# Patient Record
Sex: FEMALE | Race: WHITE | Employment: FULL TIME | URBAN - METROPOLITAN AREA
[De-identification: names, ages, dates, MRNs, and addresses within clinical notes are randomized per-mention and may not be internally consistent; named-entity substitution may affect disease eponyms.]

---

## 2020-06-24 ENCOUNTER — TRANSFERRED RECORDS (OUTPATIENT)
Dept: HEALTH INFORMATION MANAGEMENT | Facility: CLINIC | Age: 40
End: 2020-06-24

## 2020-07-28 ENCOUNTER — TRANSFERRED RECORDS (OUTPATIENT)
Dept: HEALTH INFORMATION MANAGEMENT | Facility: CLINIC | Age: 40
End: 2020-07-28

## 2020-08-07 ENCOUNTER — TRANSFERRED RECORDS (OUTPATIENT)
Dept: HEALTH INFORMATION MANAGEMENT | Facility: CLINIC | Age: 40
End: 2020-08-07

## 2020-08-10 ENCOUNTER — TRANSCRIBE ORDERS (OUTPATIENT)
Dept: OTHER | Age: 40
End: 2020-08-10

## 2020-08-10 ENCOUNTER — MEDICAL CORRESPONDENCE (OUTPATIENT)
Dept: HEALTH INFORMATION MANAGEMENT | Facility: CLINIC | Age: 40
End: 2020-08-10

## 2020-08-10 ENCOUNTER — TRANSFERRED RECORDS (OUTPATIENT)
Dept: HEALTH INFORMATION MANAGEMENT | Facility: CLINIC | Age: 40
End: 2020-08-10

## 2020-08-10 DIAGNOSIS — M54.2 NECK PAIN: Primary | ICD-10-CM

## 2020-08-12 NOTE — TELEPHONE ENCOUNTER
RECORDS RECEIVED FROM: External   Date of Appt: 8/21/20   NOTES (FOR ALL VISITS) STATUS DETAILS   OFFICE NOTE from referring provider Received Dr Aide Colunga @ [facility]:  8/5/20   OFFICE NOTE from other specialist Received Frank Rowe NP @ [facility]:  7/28/20   DISCHARGE SUMMARY from hospital N/A    DISCHARGE REPORT from the ER N/A    OPERATIVE REPORT N/A    MEDICATION LIST Received    IMAGING  (FOR ALL VISITS)     EMG N/A    EEG N/A    MRI (HEAD, NECK, SPINE) Received [facility]:  MRI Lumbar Spine 8/7/20  MRI Thoracic Spine 8/7/20    [facility]:  MRI Lumbar Spine 6/24/20   LUMBAR PUNCTURE N/A    JANE Scan N/A    CT (HEAD, NECK, SPINE) N/A       Action 8/12/20 MV 8.59am   Action Taken Imaging request faxed to [facility] for:  MRI Lumbar Spine 8/7/20  MRI Thoracic Spine 8/7/20    Imaging request faxed to [facility] for:  MRI Lumbar Spine 6/24/20     Imaging Received  8/14/20 MV 12.52pm  [facility] + [facility]   Image Type (x): Disc:   PACS: x   Exam Date/Name MRI Lumbar Spine 8/7/20  MRI Thoracic Spine 8/7/20  MRI Lumbar Spine 6/24/20 Comments: images resolved in PACS

## 2020-08-21 ENCOUNTER — VIRTUAL VISIT (OUTPATIENT)
Dept: NEUROLOGY | Facility: CLINIC | Age: 40
End: 2020-08-21
Attending: PHYSICAL MEDICINE & REHABILITATION
Payer: COMMERCIAL

## 2020-08-21 ENCOUNTER — PRE VISIT (OUTPATIENT)
Dept: NEUROLOGY | Facility: CLINIC | Age: 40
End: 2020-08-21

## 2020-08-21 DIAGNOSIS — G04.91 MYELITIS (H): Primary | ICD-10-CM

## 2020-08-21 DIAGNOSIS — R20.2 PARESTHESIAS: ICD-10-CM

## 2020-08-21 RX ORDER — CHLORAL HYDRATE 500 MG
2 CAPSULE ORAL DAILY
COMMUNITY

## 2020-08-21 RX ORDER — MULTIPLE VITAMINS W/ MINERALS TAB 9MG-400MCG
1 TAB ORAL DAILY
COMMUNITY

## 2020-08-21 RX ORDER — LORAZEPAM 1 MG/1
1 TABLET ORAL
Qty: 1 TABLET | Refills: 0 | Status: SHIPPED | OUTPATIENT
Start: 2020-08-21

## 2020-08-21 RX ORDER — GABAPENTIN 300 MG/1
CAPSULE ORAL
Qty: 300 CAPSULE | Refills: 1 | Status: SHIPPED | OUTPATIENT
Start: 2020-08-21 | End: 2020-11-02

## 2020-08-21 RX ORDER — GABAPENTIN 300 MG/1
300 CAPSULE ORAL 3 TIMES DAILY
COMMUNITY
End: 2020-08-21 | Stop reason: ALTCHOICE

## 2020-08-21 NOTE — LETTER
8/21/2020       RE: Cleo Crowe  [address]  [facility] [address]     Dear Colleague,    Thank you for referring your patient, Cleo Crowe, to the [facility] NEUROLOGY at [facility]. Please see a copy of my visit note below.    [facility] Neurology Consultation    Cleo Crowe MRN# [id number]   Age: 39 year old YOB: 1980     Requesting physician: Aide Colunga  No primary care provider on file.     Reason for Consultation: neck pain and back pain      History of Presenting Symptoms:   Cleo Crowe is a 39 year old female who presents today for evaluation of neck and back pain.  The patient has been seen through [facility] for ongoing lower back pain and was recently evaluated on 8/5/2020.  During this visit, she reports a history of b/l knee pain due to running leading to R-knee arthroscopy, then worsening back and knee pain with was evaluated through [facility] ultimately leading to 05/2020 repeat R-knee arthroscopy. During this time period, her low back pain was severely worsening and she was developing numbness in her left posterior leg, which was evaluated at [facility] where the patient was given steroids, then had MRI lumbar spine which showed mild L5-S1 disc protrusion. The patient was given gabapentin 300 mg BID with 600 mg at bedtime, and had DANILO L5-S1 on 714/2020 which wasn't helpful.  By the time she was seen on 8/5/2020, the patient was reporting worsening numbness, tingling of the left leg with involvement of her saddle region w/out bowel/bladder incontinence.  After initial evaluation, the patient had MRI lumbar and thoracic spine (see data below) and on exam had antalgic gait on the left, difficulty with toe/heel walking due to pain, intact and normal reflexes, + straight leg raise on left, and weakness of left hip flexion and knee extension (due to pain, 4-/5). After evaluation through [facility], the patient  was referred to neurology due to concern of transverse myelitis.    Today, the patient reports that prior to her fusion surgery she had symptoms of right arm weakness with shoulder abduction along with pins/needles in 3rd and 4th digits and forearm, all of which were relieved following surgery.  The patient may have had off/on back tightness with lower back pain since 02/2020.  Around 04/2020, her symptoms became more severe but with the same location (center back pain, around tailbone) with radiation outwards towards her hips b/l.  The pain continued to increase through her w/up with [facility].  Around late 06/2020 she then developed pins and needles down the back of the left leg to her foot (sole), occurring at rest and with action.  Mid-July, she developed numbness on the back of her left leg to near her achilles tendon.  She then had injections (DANILO at S1, L5), and two weeks later she still had pain with these paresthesias, the pain is described as a hot-knife in the middle of her back.  On the first week of 08/2020, the patient developed groin numbness (can feel temperature, can feel herself wipe her rectum) with reports of feeling like she has been sitting on a bike for too long.  She reports that she can feel herself urinate and defecate,but just reports that this feeling is of warmth.  She has had no loss of bowel or bladder function.  Since 08/2020, she has started to develop numbness on her left forearm and tingling in her fingers (all of them).      The patient has no swallowing difficulties. No vision changes are noted. She has tightness in her neck but no major pain issues from flexion or extension of her neck.         Past Medical History:   Osteoarthritis of the knees  Seasonal allergies  Vitamin D deficiency (low on multiple)     Past Surgical History:   C5-6 fused due to disc herniation, 2012 at [facility]  Tubal ligation in 02/2013  Tonsilectomy     Social History:   , has 2 children. No  smoking history, Drinking history is negligible.      Family History:   No major autoimmune or neurological disorders to speak of  HTN, HLD, DMII, neuroendocrine tumor requiring whipple's for tumor removal - father     Medications:   Gabapentin 1200 mg total (300/300/600)  Aleve PRN  Multivitamin, other supplements     Allergies:   No Known Allergies     Review of Systems:   A comprehensive 10 point review of systems (constitutional, ENT, cardiac, peripheral vascular, lymphatic, respiratory, GI, , Musculoskeletal, skin, Neurological) was performed and found to be negative except as described in this note.      Physical Exam:   General: Seated comfortably in no acute distress.  HEENT: Neck supple with normal range of motion. Lhermitte sign today was negative.  Skin: No rashes  Neurologic:     Mental Status: Fully alert, attentive and oriented. Speech clear and fluent, no paraphasic errors.     Cranial Nerves: EOMI with normal smooth pursuit. Facial movements symmetric. Hearing not formally tested but intact to conversation.  No dysarthria.     Motor: No tremors or other abnormal movements observed.      Sensory: Negative Romberg.      Coordination: No major dysmetria     Gait: not tested today         Data: Pertinent prior to visit   Imaging:  MRI lumbar spine: 8/7/2020  Findings: L3-4 disc desccication with mild spondylosis.  Mild circumferential disc bulge without central or foraminal stenosis.  L4-5 disc desiccation mild spondylosis with mild circumferential disc with mild facet arthritis.  L5-S1 shallow central disc protrusion with mild right facet arthritis.  Impression:  - Mild lumbar spondylitic changes without spinal canal stenosis or neural foraminal narrowing.    MRI thoracic spine: 8/7/2020  T8 vertebral body hemangioma, no central or foraminal stenosis.    MRI lumbar spine: 6/24/2020  L4 superior endplate edema.  L3-4 disc desiccation with uncinate spurring without any significant central or foraminal  stenosis with moderate b/l facet arthritis.  L4-5 annular disc bulge with lef-sided annular fissure with moderate b/l facet arthritis without significant central or foraminal stenosis.  L5-S1 central disc protrusion without central or foraminal stenosis with moderate b/l facet arthritis.    CT head: 10/10/2010 - There is no evidence of acute intracerebral hemorrhage, epidural or subdural hematoma and there is no shift of midline structures. Visualized sinuses appear normal. No fractures are noted.         Assessment and Plan:   Assessment:  Paresthesias of left leg, and now left arm. Progressive  Localized central back pain radiating into hips and legs  Paresthesia with numbness in groin, no loss of bowel/bladder function with some diminished sensation    The patient has concerning report of symptoms usually attributed to cauda equina or conus medullaris syndromes.  Her imaging was unrevealing for etiologies of these syndromes (herniated disc, abscess, hematoma, tumor, trauma, stenosis). This doesn't necessarily rule out these syndromes, but other possible etiologies for her symptoms should be looked into given that there are no motor deficit in the legs, and her reflexes were intact as per mid 08/2020 evaluations with her other providers.  I would need to examine the patient myself to be certain of these prior findings or whether there is progression of her exam over a few weeks.  I am concerned that she is now developing paresthesias of the left arm, as this should not occur in localized lumbosacral related disorders, and given her cervical fusion history she may benefit from both cervical and brain MRI to look for possible myelopathy, myelitis, or brainstem lesions causing both arm and leg symptoms.  Once this imaging is obtained, and I have follow up with the patient for an exam, I will better be able to define where further testing needs to go (serum, CSF testing for carcinomatosis/viral infection/sarcoid, EMG  for CIDP/AIDP, referrals to other specialties, post-void residual).    The patient and her  are aware that should she develop any type of weakness of her legs, have worsened issues with bowel/bladder function, or develop infectious symptoms, that they should seek immediate medical care at an ED for imaging and evaluations thorough neurology and neurosurgery.     Plan:  MRI brain w/wout contrast  MRI cervical w/wout contrast  Gabapentin titration:   600/300/600 for 5 days, then   600/600/600 for 5 days, then   600/600/900     Follow up in Neurology clinic in 3-4 weeks in-person or earlier as needed should new concerns arise.      Greater than 50% of the total time (40 min) in this patient encounter was spent on counseling and/or coordination of care. We reviewed diagnostic results, impressions, and discussed other possible tests if symptoms do not improve. We discussed the implications of the diagnosis, as well as risks and benefits of management options. We reviewed treatment instructions and our scheduled follow-up as specified in the discharge plan. We also discussed the importance of compliance with the chosen course of treatment. The patient is in agreement with this plan and has no further questions.      Again, thank you for allowing me to participate in the care of your patient.  Sincerely,    Hans Jones D.O.   of Neurology

## 2020-08-21 NOTE — PROGRESS NOTES
[facility] Neurology Consultation    Cleo Crowe MRN# [id number]   Age: 39 year old YOB: 1980     Requesting physician: Aide Colunga  No primary care provider on file.     Reason for Consultation: neck pain and back pain      History of Presenting Symptoms:   Cleo Crowe is a 39 year old female who presents today for evaluation of neck and back pain.  The patient has been seen through [facility] for ongoing lower back pain and was recently evaluated on 8/5/2020.  During this visit, she reports a history of b/l knee pain due to running leading to R-knee arthroscopy, then worsening back and knee pain with was evaluated through [facility] ultimately leading to 05/2020 repeat R-knee arthroscopy. During this time period, her low back pain was severely worsening and she was developing numbness in her left posterior leg, which was evaluated at [facility] where the patient was given steroids, then had MRI lumbar spine which showed mild L5-S1 disc protrusion. The patient was given gabapentin 300 mg BID with 600 mg at bedtime, and had DANILO L5-S1 on 714/2020 which wasn't helpful.  By the time she was seen on 8/5/2020, the patient was reporting worsening numbness, tingling of the left leg with involvement of her saddle region w/out bowel/bladder incontinence.  After initial evaluation, the patient had MRI lumbar and thoracic spine (see data below) and on exam had antalgic gait on the left, difficulty with toe/heel walking due to pain, intact and normal reflexes, + straight leg raise on left, and weakness of left hip flexion and knee extension (due to pain, 4-/5). After evaluation through [facility], the patient was referred to neurology due to concern of transverse myelitis.    Today, the patient reports that prior to her fusion surgery she had symptoms of right arm weakness with shoulder abduction along with pins/needles in 3rd and 4th digits and forearm, all of which were relieved  following surgery.  The patient may have had off/on back tightness with lower back pain since 02/2020.  Around 04/2020, her symptoms became more severe but with the same location (center back pain, around tailbone) with radiation outwards towards her hips b/l.  The pain continued to increase through her w/up with [facility].  Around late 06/2020 she then developed pins and needles down the back of the left leg to her foot (sole), occurring at rest and with action.  Mid-July, she developed numbness on the back of her left leg to near her achilles tendon.  She then had injections (DANILO at S1, L5), and two weeks later she still had pain with these paresthesias, the pain is described as a hot-knife in the middle of her back.  On the first week of 08/2020, the patient developed groin numbness (can feel temperature, can feel herself wipe her rectum) with reports of feeling like she has been sitting on a bike for too long.  She reports that she can feel herself urinate and defecate,but just reports that this feeling is of warmth.  She has had no loss of bowel or bladder function.  Since 08/2020, she has started to develop numbness on her left forearm and tingling in her fingers (all of them).      The patient has no swallowing difficulties. No vision changes are noted. She has tightness in her neck but no major pain issues from flexion or extension of her neck.         Past Medical History:   Osteoarthritis of the knees  Seasonal allergies  Vitamin D deficiency (low on multiple)     Past Surgical History:   C5-6 fused due to disc herniation, 2012 at [facility]  Tubal ligation in 02/2013  Tonsilectomy     Social History:   , has 2 children. No smoking history, Drinking history is negligible.      Family History:   No major autoimmune or neurological disorders to speak of  HTN, HLD, DMII, neuroendocrine tumor requiring whipple's for tumor removal - father     Medications:   Gabapentin 1200 mg total  (300/300/600)  Aleve PRN  Multivitamin, other supplements     Allergies:   No Known Allergies     Review of Systems:   A comprehensive 10 point review of systems (constitutional, ENT, cardiac, peripheral vascular, lymphatic, respiratory, GI, , Musculoskeletal, skin, Neurological) was performed and found to be negative except as described in this note.      Physical Exam:   General: Seated comfortably in no acute distress.  HEENT: Neck supple with normal range of motion. Lhermitte sign today was negative.  Skin: No rashes  Neurologic:     Mental Status: Fully alert, attentive and oriented. Speech clear and fluent, no paraphasic errors.     Cranial Nerves: EOMI with normal smooth pursuit. Facial movements symmetric. Hearing not formally tested but intact to conversation.  No dysarthria.     Motor: No tremors or other abnormal movements observed.      Sensory: Negative Romberg.      Coordination: No major dysmetria     Gait: not tested today         Data: Pertinent prior to visit   Imaging:  MRI lumbar spine: 8/7/2020  Findings: L3-4 disc desccication with mild spondylosis.  Mild circumferential disc bulge without central or foraminal stenosis.  L4-5 disc desiccation mild spondylosis with mild circumferential disc with mild facet arthritis.  L5-S1 shallow central disc protrusion with mild right facet arthritis.  Impression:  - Mild lumbar spondylitic changes without spinal canal stenosis or neural foraminal narrowing.    MRI thoracic spine: 8/7/2020  T8 vertebral body hemangioma, no central or foraminal stenosis.    MRI lumbar spine: 6/24/2020  L4 superior endplate edema.  L3-4 disc desiccation with uncinate spurring without any significant central or foraminal stenosis with moderate b/l facet arthritis.  L4-5 annular disc bulge with lef-sided annular fissure with moderate b/l facet arthritis without significant central or foraminal stenosis.  L5-S1 central disc protrusion without central or foraminal stenosis with  moderate b/l facet arthritis.    CT head: 10/10/2010 - There is no evidence of acute intracerebral hemorrhage, epidural or subdural hematoma and there is no shift of midline structures. Visualized sinuses appear normal. No fractures are noted.         Assessment and Plan:   Assessment:  Paresthesias of left leg, and now left arm. Progressive  Localized central back pain radiating into hips and legs  Paresthesia with numbness in groin, no loss of bowel/bladder function with some diminished sensation    The patient has concerning report of symptoms usually attributed to cauda equina or conus medullaris syndromes.  Her imaging was unrevealing for etiologies of these syndromes (herniated disc, abscess, hematoma, tumor, trauma, stenosis). This doesn't necessarily rule out these syndromes, but other possible etiologies for her symptoms should be looked into given that there are no motor deficit in the legs, and her reflexes were intact as per mid 08/2020 evaluations with her other providers.  I would need to examine the patient myself to be certain of these prior findings or whether there is progression of her exam over a few weeks.  I am concerned that she is now developing paresthesias of the left arm, as this should not occur in localized lumbosacral related disorders, and given her cervical fusion history she may benefit from both cervical and brain MRI to look for possible myelopathy, myelitis, or brainstem lesions causing both arm and leg symptoms.  Once this imaging is obtained, and I have follow up with the patient for an exam, I will better be able to define where further testing needs to go (serum, CSF testing for carcinomatosis/viral infection/sarcoid, EMG for CIDP/AIDP, referrals to other specialties, post-void residual).    The patient and her  are aware that should she develop any type of weakness of her legs, have worsened issues with bowel/bladder function, or develop infectious symptoms, that they  should seek immediate medical care at an ED for imaging and evaluations thorough neurology and neurosurgery.     Plan:  MRI brain w/wout contrast  MRI cervical w/wout contrast  Gabapentin titration:   600/300/600 for 5 days, then   600/600/600 for 5 days, then   600/600/900     Follow up in Neurology clinic in 3-4 weeks in-person or earlier as needed should new concerns arise.    LUMA Jones D.O.   of Neurology    Greater than 50% of the total time (40 min) in this patient encounter was spent on counseling and/or coordination of care. We reviewed diagnostic results, impressions, and discussed other possible tests if symptoms do not improve. We discussed the implications of the diagnosis, as well as risks and benefits of management options. We reviewed treatment instructions and our scheduled follow-up as specified in the discharge plan. We also discussed the importance of compliance with the chosen course of treatment. The patient is in agreement with this plan and has no further questions.

## 2020-08-21 NOTE — LETTER
8/21/2020       RE: Cleo Crowe  [address]  [facility] [address]     Dear Colleague,    Thank you for referring your patient, Cleo Crowe, to the [facility] NEUROLOGY at [facility]. Please see a copy of my visit note below.    [facility] Neurology Consultation    Cleo Crowe MRN# [id number]   Age: 39 year old YOB: 1980     Requesting physician: Aide Colunga  No primary care provider on file.     Reason for Consultation: neck pain and back pain      History of Presenting Symptoms:   Cleo Crowe is a 39 year old female who presents today for evaluation of neck and back pain.  The patient has been seen through [facility] for ongoing lower back pain and was recently evaluated on 8/5/2020.  During this visit, she reports a history of b/l knee pain due to running leading to R-knee arthroscopy, then worsening back and knee pain with was evaluated through [facility] ultimately leading to 05/2020 repeat R-knee arthroscopy. During this time period, her low back pain was severely worsening and she was developing numbness in her left posterior leg, which was evaluated at [facility] where the patient was given steroids, then had MRI lumbar spine which showed mild L5-S1 disc protrusion. The patient was given gabapentin 300 mg BID with 600 mg at bedtime, and had DANILO L5-S1 on 714/2020 which wasn't helpful.  By the time she was seen on 8/5/2020, the patient was reporting worsening numbness, tingling of the left leg with involvement of her saddle region w/out bowel/bladder incontinence.  After initial evaluation, the patient had MRI lumbar and thoracic spine (see data below) and on exam had antalgic gait on the left, difficulty with toe/heel walking due to pain, intact and normal reflexes, + straight leg raise on left, and weakness of left hip flexion and knee extension (due to pain, 4-/5). After evaluation through [facility], the patient  was referred to neurology due to concern of transverse myelitis.    Today, the patient reports that prior to her fusion surgery she had symptoms of right arm weakness with shoulder abduction along with pins/needles in 3rd and 4th digits and forearm, all of which were relieved following surgery.  The patient may have had off/on back tightness with lower back pain since 02/2020.  Around 04/2020, her symptoms became more severe but with the same location (center back pain, around tailbone) with radiation outwards towards her hips b/l.  The pain continued to increase through her w/up with [facility].  Around late 06/2020 she then developed pins and needles down the back of the left leg to her foot (sole), occurring at rest and with action.  Mid-July, she developed numbness on the back of her left leg to near her achilles tendon.  She then had injections (DANILO at S1, L5), and two weeks later she still had pain with these paresthesias, the pain is described as a hot-knife in the middle of her back.  On the first week of 08/2020, the patient developed groin numbness (can feel temperature, can feel herself wipe her rectum) with reports of feeling like she has been sitting on a bike for too long.  She reports that she can feel herself urinate and defecate,but just reports that this feeling is of warmth.  She has had no loss of bowel or bladder function.  Since 08/2020, she has started to develop numbness on her left forearm and tingling in her fingers (all of them).      The patient has no swallowing difficulties. No vision changes are noted. She has tightness in her neck but no major pain issues from flexion or extension of her neck.         Past Medical History:   Osteoarthritis of the knees  Seasonal allergies  Vitamin D deficiency (low on multiple)     Past Surgical History:   C5-6 fused due to disc herniation, 2012 at [facility]  Tubal ligation in 02/2013  Tonsilectomy     Social History:   , has 2 children. No  smoking history, Drinking history is negligible.      Family History:   No major autoimmune or neurological disorders to speak of  HTN, HLD, DMII, neuroendocrine tumor requiring whipple's for tumor removal - father     Medications:   Gabapentin 1200 mg total (300/300/600)  Aleve PRN  Multivitamin, other supplements     Allergies:   No Known Allergies     Review of Systems:   A comprehensive 10 point review of systems (constitutional, ENT, cardiac, peripheral vascular, lymphatic, respiratory, GI, , Musculoskeletal, skin, Neurological) was performed and found to be negative except as described in this note.      Physical Exam:   General: Seated comfortably in no acute distress.  HEENT: Neck supple with normal range of motion. Lhermitte sign today was negative.  Skin: No rashes  Neurologic:     Mental Status: Fully alert, attentive and oriented. Speech clear and fluent, no paraphasic errors.     Cranial Nerves: EOMI with normal smooth pursuit. Facial movements symmetric. Hearing not formally tested but intact to conversation.  No dysarthria.     Motor: No tremors or other abnormal movements observed.      Sensory: Negative Romberg.      Coordination: No major dysmetria     Gait: not tested today         Data: Pertinent prior to visit   Imaging:  MRI lumbar spine: 8/7/2020  Findings: L3-4 disc desccication with mild spondylosis.  Mild circumferential disc bulge without central or foraminal stenosis.  L4-5 disc desiccation mild spondylosis with mild circumferential disc with mild facet arthritis.  L5-S1 shallow central disc protrusion with mild right facet arthritis.  Impression:  - Mild lumbar spondylitic changes without spinal canal stenosis or neural foraminal narrowing.    MRI thoracic spine: 8/7/2020  T8 vertebral body hemangioma, no central or foraminal stenosis.    MRI lumbar spine: 6/24/2020  L4 superior endplate edema.  L3-4 disc desiccation with uncinate spurring without any significant central or foraminal  stenosis with moderate b/l facet arthritis.  L4-5 annular disc bulge with lef-sided annular fissure with moderate b/l facet arthritis without significant central or foraminal stenosis.  L5-S1 central disc protrusion without central or foraminal stenosis with moderate b/l facet arthritis.    CT head: 10/10/2010 - There is no evidence of acute intracerebral hemorrhage, epidural or subdural hematoma and there is no shift of midline structures. Visualized sinuses appear normal. No fractures are noted.         Assessment and Plan:   Assessment:  Paresthesias of left leg, and now left arm. Progressive  Localized central back pain radiating into hips and legs  Paresthesia with numbness in groin, no loss of bowel/bladder function with some diminished sensation    The patient has concerning report of symptoms usually attributed to cauda equina or conus medullaris syndromes.  Her imaging was unrevealing for etiologies of these syndromes (herniated disc, abscess, hematoma, tumor, trauma, stenosis). This doesn't necessarily rule out these syndromes, but other possible etiologies for her symptoms should be looked into given that there are no motor deficit in the legs, and her reflexes were intact as per mid 08/2020 evaluations with her other providers.  I would need to examine the patient myself to be certain of these prior findings or whether there is progression of her exam over a few weeks.  I am concerned that she is now developing paresthesias of the left arm, as this should not occur in localized lumbosacral related disorders, and given her cervical fusion history she may benefit from both cervical and brain MRI to look for possible myelopathy, myelitis, or brainstem lesions causing both arm and leg symptoms.  Once this imaging is obtained, and I have follow up with the patient for an exam, I will better be able to define where further testing needs to go (serum, CSF testing for carcinomatosis/viral infection/sarcoid, EMG  "for CIDP/AIDP, referrals to other specialties, post-void residual).    The patient and her  are aware that should she develop any type of weakness of her legs, have worsened issues with bowel/bladder function, or develop infectious symptoms, that they should seek immediate medical care at an ED for imaging and evaluations thorough neurology and neurosurgery.     Plan:  MRI brain w/wout contrast  MRI cervical w/wout contrast  Gabapentin titration:   600/300/600 for 5 days, then   600/600/600 for 5 days, then   600/600/900     Follow up in Neurology clinic in 3-4 weeks in-person or earlier as needed should new concerns arise.    LUMA Jones D.O.   of Neurology    Greater than 50% of the total time (40 min) in this patient encounter was spent on counseling and/or coordination of care. We reviewed diagnostic results, impressions, and discussed other possible tests if symptoms do not improve. We discussed the implications of the diagnosis, as well as risks and benefits of management options. We reviewed treatment instructions and our scheduled follow-up as specified in the discharge plan. We also discussed the importance of compliance with the chosen course of treatment. The patient is in agreement with this plan and has no further questions.      Cleo Crowe is a 39 year old female who is being evaluated via a billable video visit.      The patient has been notified of following:     \"This video visit will be conducted via a call between you and your physician/provider. We have found that certain health care needs can be provided without the need for an in-person physical exam.  This service lets us provide the care you need with a video conversation.  If a prescription is necessary we can send it directly to your pharmacy.  If lab work is needed we can place an order for that and you can then stop by our lab to have the test done at a later time.    Video visits are billed at " "different rates depending on your insurance coverage.  Please reach out to your insurance provider with any questions.    If during the course of the call the physician/provider feels a video visit is not appropriate, you will not be charged for this service.\"    Patient has given verbal consent for Video visit? YES  How would you like to obtain your AVS? MYCHART  If you are dropped from the video visit, the video invite should be resent to:   [email or web address]  Will anyone else be joining your video visit?         Video-Visit Details    Type of service:  Video Visit    Video Start Time: 1030  Video End Time: 11:33 AM    Originating Location (pt. Location): Home    Distant Location (provider location):  [facility] NEUROLOGY     Platform used for Video Visit: Earl Justice, EMT          Again, thank you for allowing me to participate in the care of your patient.      Sincerely,    Alen Jones, DO      "

## 2020-08-21 NOTE — PROGRESS NOTES
"Cleo Crowe is a 39 year old female who is being evaluated via a billable video visit.      The patient has been notified of following:     \"This video visit will be conducted via a call between you and your physician/provider. We have found that certain health care needs can be provided without the need for an in-person physical exam.  This service lets us provide the care you need with a video conversation.  If a prescription is necessary we can send it directly to your pharmacy.  If lab work is needed we can place an order for that and you can then stop by our lab to have the test done at a later time.    Video visits are billed at different rates depending on your insurance coverage.  Please reach out to your insurance provider with any questions.    If during the course of the call the physician/provider feels a video visit is not appropriate, you will not be charged for this service.\"    Patient has given verbal consent for Video visit? YES  How would you like to obtain your AVS? MYCHART  If you are dropped from the video visit, the video invite should be resent to:   [email or web address]  Will anyone else be joining your video visit?         Video-Visit Details    Type of service:  Video Visit    Video Start Time: 1030  Video End Time: 11:33 AM    Originating Location (pt. Location): Home    Distant Location (provider location):  [facility] NEUROLOGY     Platform used for Video Visit: Earl Justice, EMT        "

## 2020-10-06 ASSESSMENT — ENCOUNTER SYMPTOMS
MEMORY LOSS: 0
SPEECH CHANGE: 1
NUMBNESS: 1
PARALYSIS: 0
BACK PAIN: 1
ARTHRALGIAS: 1
JOINT SWELLING: 0
MUSCLE CRAMPS: 1
TREMORS: 0
STIFFNESS: 1
DIZZINESS: 1
DISTURBANCES IN COORDINATION: 1
HEADACHES: 0
SEIZURES: 0
LOSS OF CONSCIOUSNESS: 0
MYALGIAS: 1
TINGLING: 1
WEAKNESS: 1
NECK PAIN: 1
MUSCLE WEAKNESS: 1

## 2020-10-07 ENCOUNTER — AMBULATORY - HEALTHEAST (OUTPATIENT)
Dept: SURGERY | Facility: CLINIC | Age: 40
End: 2020-10-07

## 2020-10-07 DIAGNOSIS — Z11.59 ENCOUNTER FOR SCREENING FOR OTHER VIRAL DISEASES: ICD-10-CM

## 2020-10-19 ASSESSMENT — MIFFLIN-ST. JEOR: SCORE: 1350.35

## 2020-10-23 ENCOUNTER — AMBULATORY - HEALTHEAST (OUTPATIENT)
Dept: LAB | Facility: CLINIC | Age: 40
End: 2020-10-23

## 2020-10-23 DIAGNOSIS — Z11.59 ENCOUNTER FOR SCREENING FOR OTHER VIRAL DISEASES: ICD-10-CM

## 2020-10-25 ENCOUNTER — COMMUNICATION - HEALTHEAST (OUTPATIENT)
Dept: SCHEDULING | Facility: CLINIC | Age: 40
End: 2020-10-25

## 2020-10-26 ENCOUNTER — ANESTHESIA - HEALTHEAST (OUTPATIENT)
Dept: SURGERY | Facility: CLINIC | Age: 40
End: 2020-10-26

## 2020-10-26 ENCOUNTER — SURGERY - HEALTHEAST (OUTPATIENT)
Dept: SURGERY | Facility: CLINIC | Age: 40
End: 2020-10-26

## 2020-10-26 ASSESSMENT — MIFFLIN-ST. JEOR
SCORE: 1350.35
SCORE: 1350.35

## 2020-11-02 ENCOUNTER — OFFICE VISIT (OUTPATIENT)
Dept: NEUROLOGY | Facility: CLINIC | Age: 40
End: 2020-11-02
Payer: COMMERCIAL

## 2020-11-02 ENCOUNTER — TELEPHONE (OUTPATIENT)
Dept: NEUROLOGY | Facility: CLINIC | Age: 40
End: 2020-11-02

## 2020-11-02 VITALS
SYSTOLIC BLOOD PRESSURE: 142 MMHG | OXYGEN SATURATION: 100 % | WEIGHT: 144 LBS | DIASTOLIC BLOOD PRESSURE: 88 MMHG | HEART RATE: 80 BPM

## 2020-11-02 DIAGNOSIS — R20.2 PARESTHESIAS: Primary | ICD-10-CM

## 2020-11-02 PROCEDURE — 99214 OFFICE O/P EST MOD 30 MIN: CPT | Performed by: PSYCHIATRY & NEUROLOGY

## 2020-11-02 RX ORDER — POLYETHYLENE GLYCOL 3350 17 G/17G
17 POWDER, FOR SOLUTION ORAL
COMMUNITY
Start: 2020-10-26

## 2020-11-02 RX ORDER — AMOXICILLIN 250 MG
1-2 CAPSULE ORAL
COMMUNITY
Start: 2020-10-26

## 2020-11-02 RX ORDER — ALBUTEROL SULFATE 90 UG/1
2 AEROSOL, METERED RESPIRATORY (INHALATION)
COMMUNITY

## 2020-11-02 RX ORDER — OXYCODONE HYDROCHLORIDE 5 MG/1
5 TABLET ORAL
COMMUNITY
Start: 2020-10-26

## 2020-11-02 RX ORDER — VIT C/B6/B5/MAGNESIUM/HERB 173 50-5-6-5MG
500 CAPSULE ORAL
COMMUNITY

## 2020-11-02 RX ORDER — ASPIRIN 325 MG
325 TABLET ORAL
COMMUNITY
Start: 2020-10-26

## 2020-11-02 RX ORDER — NAPROXEN SODIUM 220 MG
220 TABLET ORAL
COMMUNITY

## 2020-11-02 RX ORDER — TRAMADOL HYDROCHLORIDE 50 MG/1
50 TABLET ORAL
COMMUNITY
Start: 2020-10-19

## 2020-11-02 RX ORDER — HYDROXYZINE HYDROCHLORIDE 25 MG/1
25 TABLET, FILM COATED ORAL
COMMUNITY
Start: 2020-10-26

## 2020-11-02 RX ORDER — ACETAMINOPHEN 325 MG/1
650 TABLET ORAL
COMMUNITY
Start: 2020-10-26

## 2020-11-02 RX ORDER — KRILL/OM-3/DHA/EPA/PHOSPHO/AST 500-110 MG
1 CAPSULE ORAL
COMMUNITY

## 2020-11-02 RX ORDER — ERGOCALCIFEROL 1.25 MG/1
50000 CAPSULE ORAL
COMMUNITY

## 2020-11-02 RX ORDER — FOLIC ACID/MULTIVIT,IRON,MINER 0.4MG-18MG
TABLET ORAL
COMMUNITY

## 2020-11-02 RX ORDER — GABAPENTIN 300 MG/1
CAPSULE ORAL
Qty: 300 CAPSULE | Refills: 1 | Status: SHIPPED | OUTPATIENT
Start: 2020-11-02

## 2020-11-02 ASSESSMENT — PAIN SCALES - GENERAL: PAINLEVEL: MODERATE PAIN (4)

## 2020-11-02 NOTE — TELEPHONE ENCOUNTER
I confirmed with pharmacy pt had been taking 7 capsules daily of gabapentin prior to taper order today. They said they calculated how may pills would be needed to complete taper would by 84 pills for a 24 day supply. I confirmed that would be okay to adjust quantity to 84 from 300.     We were disconnected. I attempted call back to confirm that was all that was needed but could not get through.     Lena ROSS   Bedside US showed approximately 250cc of urine in bladder despite patient self catheterizing herself recently. Patient resting comfortably without return of gi bleeding. Awaiting repeat CBC. Patient stable without further bleeding. Patient to follow-up with PMD and GI. Patient called to discuss her results again. She states bleeding has not returned but admits to being very nervous about having to undergo a colonoscopy and what might be found. Attempted to alleviated some of her fears. She states she has an appointment with Dr. Cox in a couple of days.

## 2020-11-02 NOTE — TELEPHONE ENCOUNTER
Returned call to pharmacy. I confirmed okay to adjust pt gabapentin quantity to 84 pills with 0 refills to match taper directions.     Lena ROSS

## 2020-11-02 NOTE — LETTER
Date:November 22, 2020      Patient was self referred, no letter generated. Do not send.        [facility] Health Information

## 2020-11-02 NOTE — TELEPHONE ENCOUNTER
[facility] Call Center    Phone Message    May a detailed message be left on voicemail: yes     Reason for Call: Other: Chinyere calling to request a call back. She needs clarification on gabapentin (NEURONTIN) 300 MG capsule. Please call her back to discuss.      Action Taken: Message routed to:  [facility] ([facility] neuro     Travel Screening: Not Applicable

## 2020-11-02 NOTE — LETTER
11/2/2020       RE: Cleo Crowe  [address]  [facility] [address]     Dear Colleague,    Thank you for referring your patient, Cleo Crowe, to the [facility] NEUROLOGY CLINIC [place] at [facility]. Please see a copy of my visit note below.    [facility] Neurology Follow Up Visit    Cleo Crowe MRN# [id number]   Age: 39 year old YOB: 1980     Brief history of symptoms: The patient was initially seen in neurologic consultation on 8/21/2020 for evaluation of neck and back pain. Please see the comprehensive neurologic consultation note from that date in the Epic records for details.  Primary concern for initial consultation was for transverse myelitis in the setting of history of b/l knee pain, C5-6 fusion due to disc herniation (2012, [facility]),  L5-S1 disc protrusion with DANILO (L5-S1) on 7/14/2020, and developing (8/5/220) numbness/tingling of the left leg w/out bladder/bowel incontinence.  Exam prior to consultation was + for antalgic gait on left, normal reflexes, + straight leg on left, weakness on left hip flexion/knee extension.  Imaging of lumbar spine (MRI) on 8/7/2020 was suggestive for disc desiccation at L3-4, mild disc bulge at the same level, and shallow central disc protrusion w/mild right facet arthritis.  After initial virtual evaluation, the patient was to have imaging of the brain and cervical spine as well as trial gabapentin for pain relief.      MRI brain and cervical spine were done 8/21/2020 through [facility].  Overall, images were unrevealing for stroke, myelitis, central cord compression, or major neuro foraminal stenosis which could lead to her symptoms.    Interval history: The patient had right osteoarthritis of the hip and was admitted 10/26/2020 for total right hip arthroplasty.      Today, event past the procedure (hip replacement) the patient still has pins and needles in the b/l thighs b/l, and  left leg/sole of foot which as been ever present since 06/2020.  Gabapentin hasn't been helpful so far for the numbness and tingling.  There is no discernable weakness, and no further spread of numbness/tingling into other regions of the leg.  The symptoms remain constant and are difficult to trigger with specific motions of the leg or body/back.       Medications:     Current Outpatient Medications   Medication Sig     CLARITIN 10 MG OR TABS ONE DAILY     DEMULEN 1/35 (28) 1-35 MG-MCG OR TABS 1 TABLET DAILY     fish oil-omega-3 fatty acids 1000 MG capsule Take 2 g by mouth daily     gabapentin (NEURONTIN) 300 MG capsule Take 600 mg AM, 300 mg noon, 600 mg PM (600/300/600) for 5 days.  Then, take 600/600/600 for five days. Then take 600/600/900.     HEMP OIL OR EXTRACT OR OTHER CBD CANNABINOID, NOT MEDICAL CANNABIS,      LORazepam (ATIVAN) 1 MG tablet Take 1 tablet (1 mg) by mouth once as needed for anxiety (Take one hour prior to imaging study)     multivitamin w/minerals (MULTI-VITAMIN) tablet Take 1 tablet by mouth daily     SEASONALE 0.15-0.03 MG OR TABS 1 TABLET DAILY     vitamin D3 (CHOLECALCIFEROL) 1.25 MG (40290 UT) capsule Take 50,000 Units by mouth every 7 days      Review of Systems:   A comprehensive 10 point review of systems (constitutional, ENT, cardiac, peripheral vascular, lymphatic, respiratory, GI, , Musculoskeletal, skin, Neurological) was performed and found to be negative except as described in this note.      Physical Exam:   Vitals: BP (!) 142/88   Pulse 80   Wt 65.3 kg (144 lb)   SpO2 100%    General: Seated comfortably in no acute distress.  HEENT: Neck supple with normal range of motion. No paracervical muscle tenderness or tightness.   Heart: Regular rate  Lungs: breathing comfortably  GI: Non tender  Extremities: no edema. Recent surgical incision on right hip.  Skin: No rashes  Neurologic:     Mental Status: Fully alert, attentive and oriented. Speech clear and fluent, no  paraphasic errors.      Cranial Nerves: Visual fields intact. PERRL. EOMI with normal smooth pursuit. Facial sensation intact/symmetric. Facial movements symmetric. Hearing not formally tested but intact to conversation. Palate elevation symmetric, uvula midline. No dysarthria. Shoulder shrug strong bilaterally. Tongue protrusion midline.     Motor: No tremors or other abnormal movements observed. Muscle tone normal throughout. No pronator drift. Normal/symmetric rapid finger tapping. Strength 5/5 throughout upper and lower extremities except for weakness attributed to recent surgery on right hip leading to painful movements.     Deep Tendon Reflexes: 2+/symmetric throughout upper and lower extremities. No clonus. Toes downgoing bilaterally.     Sensory: lateral anterior thigh numbness (L4/5 distribution + lateral femoral cutaneous nerve distribution) as well as S1/L5 dermatomal pattern of sensory loss in posterior leg and plantar surface of the foot. No diminished vibration at great toes b/l.  Proprioception intact.     Gait: Antalgic gait with use of cane following right hip surgery    Pertinent Investigations since last visit:   MRI cervical spine and brain: 8/21/2020  FINDINGS:  HEAD MRI:  INTRACRANIAL CONTENTS: No finding for acute infarct, extra-axial collection, or abnormal enhancement. No significant parenchymal signal abnormality. Normal ventricular size and configuration. Major intracranial vascular flow voids are maintained. Brainstem and cerebellum are unremarkable. Cerebellar tonsils are normally positioned.  SELLA: No abnormality accounting for technique.  OSSEOUS STRUCTURES/SOFT TISSUES: Normal marrow signal.  ORBITS: No abnormality accounting for technique.   SINUSES/MASTOIDS: No paranasal sinus mucosal disease. No middle ear or mastoid effusion.     CERVICAL SPINE:   Anterior approach discectomy and fusion of C5-C6 with solid bony fusion across the disc space. Straightening of usual cervical  lordosis with minimal anterolisthesis of C3 on C4 and minimal retrolisthesis of C4 on C5. Otherwise unremarkable alignment. Maintained vertebral body heights. Type I Modic endplate degenerative change anteriorly at C6-C7 with type II Modic endplate degenerative change anteriorly at C4-C5. Otherwise unremarkable marrow signal elsewhere. No abnormal cord signal or enhancement. No extraspinal abnormality.  Craniovertebral junction and C1-C2: Normal.  C2-C3: Normal disc height. No herniation. Normal facets. No spinal canal or neural foraminal stenosis.   C3-C4: Normal disc height. No herniation. Normal facets. No spinal canal or neural foraminal stenosis.   C4-C5: Mild loss of disc height with a small posterior disc osteophyte complex, accompanied by right-sided uncinate spurring. Facet arthropathy is mild/minimal bilaterally. Mild spinal canal stenosis. Moderate right and minimal left neural foraminal stenosis.   C5-C6: Anterior approach discectomy and fusion with solid bony fusion across the disc space. No significant facet arthropathy. Spinal canal and neural foramina are widely patent.   C6-C7: Mild loss of disc height with a small posterior disc osteophyte complex. No significant facet arthropathy. Mild spinal canal stenosis with no significant right neural foraminal stenosis. Minimal left neural foraminal stenosis.   C7-T1: Normal disc height. No herniation. Normal facets. No spinal canal or neural foraminal stenosis.    IMPRESSION:  HEAD MRI:   1.  Normal brain MRI.  CERVICAL SPINE MRI:  1.  Anterior approach discectomy and fusion of C5 to C6 with solid bony fusion across the disc space and a mild adjacent segment degenerative disc disease at both the C4-C5 and C6-C7 levels.  2.  Small posterior disc osteophyte complexes at C4-C5 and at C6-C7 contribute to a mild spinal canal stenosis.  3.  Apparent moderate right and minimal left C4-C5 neural foraminal stenosis with additional minimal left C6-C7 neural foraminal  stenosis.  4.  No cord signal abnormality and no abnormal cord enhancement.         Assessment and Plan:   Assessment:  Sensory loss over left anterior thigh, posterior leg, and sole of foot    The patient still has some sensory loss or disruption of light-touch and pain over an atypical distribution along the left leg.  Her sensory loss would be over a mixture of L4-S1 sensory regions but would be patchy, and wouldn't be due to lateral femoral cutaneous syndrome due to the inclusion of the leg and foot. Given the lack of progression regarding Cervical Spine MRI and normal brain MRI, I suspect some of her symptoms may be related to prior lumbar imaging showing  L3-4  disc bulge and desiccation, as well as shallow central disc protrusion w/mild right facet arthritis.  Further investigation with EMG would be helpful to see if there is an axonal or demyelinating process affecting individual sensory nerves versus other compression leading to atypical sensory loss.      Plan:  EMG b/l lower extremities (symptoms predominantly on left, but sensory loss is reported still in the anterior thigh of the right leg)    Follow up in Neurology clinic in 5 weeks or earlier as needed should new concerns arise.    LUMA Jones D.O.   of Neurology      Greater than 50% of the total time (40 min) in this patient encounter was spent on counseling and/or coordination of care. We reviewed diagnostic results, impressions, and discussed other possible tests if symptoms do not improve. We discussed the implications of the diagnosis, as well as risks and benefits of management options. We reviewed treatment instructions and our scheduled follow-up as specified in the discharge plan. We also discussed the importance of compliance with the chosen course of treatment. The patient is in agreement with this plan and has no further questions.        Again, thank you for allowing me to participate in the care of your  patient.      Sincerely,    Alen Jones, DO

## 2020-11-02 NOTE — PROGRESS NOTES
[facility] Neurology Follow Up Visit    Cleo Crowe MRN# [id number]   Age: 39 year old YOB: 1980     Brief history of symptoms: The patient was initially seen in neurologic consultation on 8/21/2020 for evaluation of neck and back pain. Please see the comprehensive neurologic consultation note from that date in the Epic records for details.  Primary concern for initial consultation was for transverse myelitis in the setting of history of b/l knee pain, C5-6 fusion due to disc herniation (2012, [facility]),  L5-S1 disc protrusion with DANILO (L5-S1) on 7/14/2020, and developing (8/5/220) numbness/tingling of the left leg w/out bladder/bowel incontinence.  Exam prior to consultation was + for antalgic gait on left, normal reflexes, + straight leg on left, weakness on left hip flexion/knee extension.  Imaging of lumbar spine (MRI) on 8/7/2020 was suggestive for disc desiccation at L3-4, mild disc bulge at the same level, and shallow central disc protrusion w/mild right facet arthritis.  After initial virtual evaluation, the patient was to have imaging of the brain and cervical spine as well as trial gabapentin for pain relief.      MRI brain and cervical spine were done 8/21/2020 through [facility].  Overall, images were unrevealing for stroke, myelitis, central cord compression, or major neuro foraminal stenosis which could lead to her symptoms.    Interval history: The patient had right osteoarthritis of the hip and was admitted 10/26/2020 for total right hip arthroplasty.      Today, event past the procedure (hip replacement) the patient still has pins and needles in the b/l thighs b/l, and left leg/sole of foot which as been ever present since 06/2020.  Gabapentin hasn't been helpful so far for the numbness and tingling.  There is no discernable weakness, and no further spread of numbness/tingling into other regions of the leg.  The symptoms remain constant and are difficult to trigger with  specific motions of the leg or body/back.       Medications:     Current Outpatient Medications   Medication Sig     CLARITIN 10 MG OR TABS ONE DAILY     DEMULEN 1/35 (28) 1-35 MG-MCG OR TABS 1 TABLET DAILY     fish oil-omega-3 fatty acids 1000 MG capsule Take 2 g by mouth daily     gabapentin (NEURONTIN) 300 MG capsule Take 600 mg AM, 300 mg noon, 600 mg PM (600/300/600) for 5 days.  Then, take 600/600/600 for five days. Then take 600/600/900.     HEMP OIL OR EXTRACT OR OTHER CBD CANNABINOID, NOT MEDICAL CANNABIS,      LORazepam (ATIVAN) 1 MG tablet Take 1 tablet (1 mg) by mouth once as needed for anxiety (Take one hour prior to imaging study)     multivitamin w/minerals (MULTI-VITAMIN) tablet Take 1 tablet by mouth daily     SEASONALE 0.15-0.03 MG OR TABS 1 TABLET DAILY     vitamin D3 (CHOLECALCIFEROL) 1.25 MG (03283 UT) capsule Take 50,000 Units by mouth every 7 days      Review of Systems:   A comprehensive 10 point review of systems (constitutional, ENT, cardiac, peripheral vascular, lymphatic, respiratory, GI, , Musculoskeletal, skin, Neurological) was performed and found to be negative except as described in this note.      Physical Exam:   Vitals: BP (!) 142/88   Pulse 80   Wt 65.3 kg (144 lb)   SpO2 100%    General: Seated comfortably in no acute distress.  HEENT: Neck supple with normal range of motion. No paracervical muscle tenderness or tightness.   Heart: Regular rate  Lungs: breathing comfortably  GI: Non tender  Extremities: no edema. Recent surgical incision on right hip.  Skin: No rashes  Neurologic:     Mental Status: Fully alert, attentive and oriented. Speech clear and fluent, no paraphasic errors.      Cranial Nerves: Visual fields intact. PERRL. EOMI with normal smooth pursuit. Facial sensation intact/symmetric. Facial movements symmetric. Hearing not formally tested but intact to conversation. Palate elevation symmetric, uvula midline. No dysarthria. Shoulder shrug strong bilaterally.  Tongue protrusion midline.     Motor: No tremors or other abnormal movements observed. Muscle tone normal throughout. No pronator drift. Normal/symmetric rapid finger tapping. Strength 5/5 throughout upper and lower extremities except for weakness attributed to recent surgery on right hip leading to painful movements.     Deep Tendon Reflexes: 2+/symmetric throughout upper and lower extremities. No clonus. Toes downgoing bilaterally.     Sensory: lateral anterior thigh numbness (L4/5 distribution + lateral femoral cutaneous nerve distribution) as well as S1/L5 dermatomal pattern of sensory loss in posterior leg and plantar surface of the foot. No diminished vibration at great toes b/l.  Proprioception intact.     Gait: Antalgic gait with use of cane following right hip surgery    Pertinent Investigations since last visit:   MRI cervical spine and brain: 8/21/2020  FINDINGS:  HEAD MRI:  INTRACRANIAL CONTENTS: No finding for acute infarct, extra-axial collection, or abnormal enhancement. No significant parenchymal signal abnormality. Normal ventricular size and configuration. Major intracranial vascular flow voids are maintained. Brainstem and cerebellum are unremarkable. Cerebellar tonsils are normally positioned.  SELLA: No abnormality accounting for technique.  OSSEOUS STRUCTURES/SOFT TISSUES: Normal marrow signal.  ORBITS: No abnormality accounting for technique.   SINUSES/MASTOIDS: No paranasal sinus mucosal disease. No middle ear or mastoid effusion.     CERVICAL SPINE:   Anterior approach discectomy and fusion of C5-C6 with solid bony fusion across the disc space. Straightening of usual cervical lordosis with minimal anterolisthesis of C3 on C4 and minimal retrolisthesis of C4 on C5. Otherwise unremarkable alignment. Maintained vertebral body heights. Type I Modic endplate degenerative change anteriorly at C6-C7 with type II Modic endplate degenerative change anteriorly at C4-C5. Otherwise unremarkable marrow  signal elsewhere. No abnormal cord signal or enhancement. No extraspinal abnormality.  Craniovertebral junction and C1-C2: Normal.  C2-C3: Normal disc height. No herniation. Normal facets. No spinal canal or neural foraminal stenosis.   C3-C4: Normal disc height. No herniation. Normal facets. No spinal canal or neural foraminal stenosis.   C4-C5: Mild loss of disc height with a small posterior disc osteophyte complex, accompanied by right-sided uncinate spurring. Facet arthropathy is mild/minimal bilaterally. Mild spinal canal stenosis. Moderate right and minimal left neural foraminal stenosis.   C5-C6: Anterior approach discectomy and fusion with solid bony fusion across the disc space. No significant facet arthropathy. Spinal canal and neural foramina are widely patent.   C6-C7: Mild loss of disc height with a small posterior disc osteophyte complex. No significant facet arthropathy. Mild spinal canal stenosis with no significant right neural foraminal stenosis. Minimal left neural foraminal stenosis.   C7-T1: Normal disc height. No herniation. Normal facets. No spinal canal or neural foraminal stenosis.    IMPRESSION:  HEAD MRI:   1.  Normal brain MRI.  CERVICAL SPINE MRI:  1.  Anterior approach discectomy and fusion of C5 to C6 with solid bony fusion across the disc space and a mild adjacent segment degenerative disc disease at both the C4-C5 and C6-C7 levels.  2.  Small posterior disc osteophyte complexes at C4-C5 and at C6-C7 contribute to a mild spinal canal stenosis.  3.  Apparent moderate right and minimal left C4-C5 neural foraminal stenosis with additional minimal left C6-C7 neural foraminal stenosis.  4.  No cord signal abnormality and no abnormal cord enhancement.         Assessment and Plan:   Assessment:  Sensory loss over left anterior thigh, posterior leg, and sole of foot    The patient still has some sensory loss or disruption of light-touch and pain over an atypical distribution along the left  leg.  Her sensory loss would be over a mixture of L4-S1 sensory regions but would be patchy, and wouldn't be due to lateral femoral cutaneous syndrome due to the inclusion of the leg and foot. Given the lack of progression regarding Cervical Spine MRI and normal brain MRI, I suspect some of her symptoms may be related to prior lumbar imaging showing  L3-4  disc bulge and desiccation, as well as shallow central disc protrusion w/mild right facet arthritis.  Further investigation with EMG would be helpful to see if there is an axonal or demyelinating process affecting individual sensory nerves versus other compression leading to atypical sensory loss.      Plan:  EMG b/l lower extremities (symptoms predominantly on left, but sensory loss is reported still in the anterior thigh of the right leg)    Follow up in Neurology clinic in 5 weeks or earlier as needed should new concerns arise.    LUMA Jones D.O.   of Neurology      Greater than 50% of the total time (40 min) in this patient encounter was spent on counseling and/or coordination of care. We reviewed diagnostic results, impressions, and discussed other possible tests if symptoms do not improve. We discussed the implications of the diagnosis, as well as risks and benefits of management options. We reviewed treatment instructions and our scheduled follow-up as specified in the discharge plan. We also discussed the importance of compliance with the chosen course of treatment. The patient is in agreement with this plan and has no further questions.
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
## 2020-11-02 NOTE — NURSING NOTE
Chief Complaint   Patient presents with     RECHECK     [facility] RETURN - F/U NEUROPATHY     Jonathan Young

## 2020-11-02 NOTE — PATIENT INSTRUCTIONS
You have an atypical distribution of symptom (sensory) on the left leg.  Further investigation into a sensory nerve injury should be done with an EMG.  Following this test, I will be able to better understand what our next best steps are for dealing with the sensory issues of the leg (either look into peripheral neuropathies versus small fiber neuropathies).     EMG - b/l lower extremities

## 2020-11-04 ENCOUNTER — OFFICE VISIT (OUTPATIENT)
Dept: NEUROLOGY | Facility: CLINIC | Age: 40
End: 2020-11-04
Payer: COMMERCIAL

## 2020-11-04 DIAGNOSIS — R20.2 PARESTHESIAS: Primary | ICD-10-CM

## 2020-11-04 PROCEDURE — 95909 NRV CNDJ TST 5-6 STUDIES: CPT | Performed by: PSYCHIATRY & NEUROLOGY

## 2020-11-04 PROCEDURE — 95886 MUSC TEST DONE W/N TEST COMP: CPT | Performed by: PSYCHIATRY & NEUROLOGY

## 2020-11-04 NOTE — LETTER
11/4/2020     RE: Cleo Crowe  [address]  [place] [facility] [address]     Dear Colleague,    Thank you for referring your patient, Cleo Crowe, to the [facility] [place] at [facility]. Please see a copy of my visit note below.        [facility]  Electrodiagnostic Laboratory    Nerve Conduction & EMG Report          Patient: Cleo Crowe YOB: 1980  Patient ID: [id number] Age: 39 Years 10 Months  Gender: Female      History & Examination:  Cleo is a 39-year-old woman referred for EMG by Dr. Jones for assessment of left sided paresthesias. She has patchy paresthesias in the left anterior to lateral thigh and in the lower leg as well as some other milder symptoms up by the shoulder.    She is recovering from a right hip replacement.     Strength in the left lower extremity is normal. She has reduced pinprick sensation in the left thigh in the lateral and anterior area.     Techniques:  Sensory and motor conduction studies were done with surface recording electrodes. EMG was done with a concentric needle electrode.     Results:  Bilateral lower extremity sensory and motor nerve conduction studies are within normal limits. Left tibial f-wave is normal. Needle examination of the left lower extremity is normal except for chronci mild neurogenic changes seen in the left iliopsoas and vastus lateralis.     Interpretation:    This is an abnormal EMG. The needle examination findings are seen in only muscles innervated by the femoral nerve. Clinical correlation is recommended as to whether this represent a femoral nerve injury vs an L3 or 4 nerve root injury. No evidence of active denervation.    EMG Physician:  Luana Bryan MD FAAN        Sensory NCS      Nerve / Sites Rec. Site Onset Peak NP Amp Ref. PP Amp Dist Jason Ref. Temp     ms ms  V  V  V cm m/s m/s  C   L SURAL - Lat Mall      Calf Ankle 2.92 3.70 19.7 8.0 20.1  14 48.0 38.0 29   R SURAL - Lat Mall      Calf Ankle 3.02 4.11 20.7 8.0 25.2 14 46.3 38.0 28   L SUP PERONEAL      Lat Leg [place] 2.40 3.28 15.4  22.0 12.5 52.2 38.0 28.9   R SUP PERONEAL      Lat Leg [place] 2.92 3.59 12.3  9.4 12.5 42.9 38.0 27.1       Motor NCS      Nerve / Sites Rec. Site Lat Ref. Amp Ref. Rel Amp Dist Jason Ref. Dur. Area Temp.     ms ms mV mV % cm m/s m/s ms %  C   L DEEP PERONEAL - EDB      Ankle EDB 4.01 6.00 8.6 2.5 100 8   6.15 100 28.7      FibHead EDB 10.26  8.6  99.8 32 51.2 38.0 6.09 95.1 28.9      Pop Fos EDB 11.61  8.1  94.6 9 66.5 38.0 6.04 94.5 28.9   L TIBIAL - AH      Ankle AH 3.65 6.00 12.3 4.0 100 8   6.41 100 28.4      Pop Fos AH 11.41  11.3  92 39 50.3 38.0 7.19 101 28.3       F  Wave      Nerve Min F Lat Max F Lat Mean FLat Temp.    ms ms ms  C   L TIBIAL 46.72 51.88 49.22 28.1       EMG Summary Table     Spontaneous MUAP Recruitment    IA Fib/PSW Fasc H.F. Amp Dur. PPP Pattern   L. TIB ANTERIOR N None None None N N None Normal   L. GASTROCN (MED) N None None None N N None Normal   L. TIB POSTERIOR N None None None N N None Normal   L. VAST LATERALIS N None None None 1+ 1+ None Mild Reduced   L. ADD LONGUS N None None None N N None Normal   L. ILIOPSOAS N None None None 1+ 1+ None Mild Reduced   L. T FASCIA RODRIGO N None None None N N None Normal   L. L3 PSP N None None None N N None Normal                   Again, thank you for allowing me to participate in the care of your patient.      Sincerely,    Luana Bryan MD

## 2020-11-05 NOTE — PROGRESS NOTES
[facility]  Electrodiagnostic Laboratory    Nerve Conduction & EMG Report          Patient: Cleo Crowe YOB: 1980  Patient ID: [id number] Age: 39 Years 10 Months  Gender: Female      History & Examination:  Cleo is a 39-year-old woman referred for EMG by Dr. Jones for assessment of left sided paresthesias. She has patchy paresthesias in the left anterior to lateral thigh and in the lower leg as well as some other milder symptoms up by the shoulder.    She is recovering from a right hip replacement.     Strength in the left lower extremity is normal. She has reduced pinprick sensation in the left thigh in the lateral and anterior area.     Techniques:  Sensory and motor conduction studies were done with surface recording electrodes. EMG was done with a concentric needle electrode.     Results:  Bilateral lower extremity sensory and motor nerve conduction studies are within normal limits. Left tibial f-wave is normal. Needle examination of the left lower extremity is normal except for chronci mild neurogenic changes seen in the left iliopsoas and vastus lateralis.     Interpretation:    This is an abnormal EMG. The needle examination findings are seen in only muscles innervated by the femoral nerve. Clinical correlation is recommended as to whether this represent a femoral nerve injury vs an L3 or 4 nerve root injury. No evidence of active denervation.    EMG Physician:  Luana Bryan MD FAAN        Sensory NCS      Nerve / Sites Rec. Site Onset Peak NP Amp Ref. PP Amp Dist Jason Ref. Temp     ms ms  V  V  V cm m/s m/s  C   L SURAL - Lat Mall      Calf Ankle 2.92 3.70 19.7 8.0 20.1 14 48.0 38.0 29   R SURAL - Lat Mall      Calf Ankle 3.02 4.11 20.7 8.0 25.2 14 46.3 38.0 28   L SUP PERONEAL      Lat Leg [place] 2.40 3.28 15.4  22.0 12.5 52.2 38.0 28.9   R SUP PERONEAL      Lat Leg [place] 2.92 3.59 12.3  9.4 12.5 42.9 38.0 27.1       Motor NCS      Nerve / Sites Rec. Site Lat Ref. Amp  Ref. Rel Amp Dist Jason Ref. Dur. Area Temp.     ms ms mV mV % cm m/s m/s ms %  C   L DEEP PERONEAL - EDB      Ankle EDB 4.01 6.00 8.6 2.5 100 8   6.15 100 28.7      FibHead EDB 10.26  8.6  99.8 32 51.2 38.0 6.09 95.1 28.9      Pop Fos EDB 11.61  8.1  94.6 9 66.5 38.0 6.04 94.5 28.9   L TIBIAL - AH      Ankle AH 3.65 6.00 12.3 4.0 100 8   6.41 100 28.4      Pop Fos AH 11.41  11.3  92 39 50.3 38.0 7.19 101 28.3       F  Wave      Nerve Min F Lat Max F Lat Mean FLat Temp.    ms ms ms  C   L TIBIAL 46.72 51.88 49.22 28.1       EMG Summary Table     Spontaneous MUAP Recruitment    IA Fib/PSW Fasc H.F. Amp Dur. PPP Pattern   L. TIB ANTERIOR N None None None N N None Normal   L. GASTROCN (MED) N None None None N N None Normal   L. TIB POSTERIOR N None None None N N None Normal   L. VAST LATERALIS N None None None 1+ 1+ None Mild Reduced   L. ADD LONGUS N None None None N N None Normal   L. ILIOPSOAS N None None None 1+ 1+ None Mild Reduced   L. T FASCIA RODRIGO N None None None N N None Normal   L. L3 PSP N None None None N N None Normal                         [id number] Comprehensive

## 2020-11-09 ENCOUNTER — VIRTUAL VISIT (OUTPATIENT)
Dept: NEUROLOGY | Facility: CLINIC | Age: 40
End: 2020-11-09
Payer: COMMERCIAL

## 2020-11-09 DIAGNOSIS — R20.2 PARESTHESIAS: Primary | ICD-10-CM

## 2020-11-09 PROCEDURE — 99214 OFFICE O/P EST MOD 30 MIN: CPT | Mod: GT | Performed by: PSYCHIATRY & NEUROLOGY

## 2020-11-09 NOTE — LETTER
11/9/2020       RE: Cleo Crowe  [address]  [place] [facility] [address]     Dear Colleague,    Thank you for referring your patient, Cleo Crowe, to the [facility] [place] at [facility]. Please see a copy of my visit note below.    [facility] Neurology Follow Up Visit    Cleo Crowe MRN# [id number]   Age: 39 year old YOB: 1980     Brief history of symptoms: The patient was initially seen in neurologic consultation on 8/21/2020 and then for follow up on 11/2/2020 for evaluation of parethesias. Please see the comprehensive neurologic consultation note from that date in the Epic records for details. Impression after follow up was for sensory loss over the left anterior thigh/posterior leg/sole of foot in atypical distrubution not characteristic for dermatome or peripheral nerve injury.  Further evaluation with EMG was to be done.    Interval history: EMG was done on 11/4/2020, which showed mild neurogenic changes in left iliopsoas and vastus lateralis.  Interestingly, sensory and motor nerve conduction studies were normal.    Today, there is no major progression of symptoms since this last week.  The patient is taking gabapentin 900 mg TID, and it hasn't really been helpful.       Medications:     Current Outpatient Medications   Medication Sig     acetaminophen (TYLENOL) 325 MG tablet Take 650 mg by mouth     albuterol (PROAIR HFA/PROVENTIL HFA/VENTOLIN HFA) 108 (90 Base) MCG/ACT inhaler Inhale 2 puffs into the lungs     aspirin (ASA) 325 MG tablet Take 325 mg by mouth     CLARITIN 10 MG OR TABS ONE DAILY     DEMULEN 1/35 (28) 1-35 MG-MCG OR TABS 1 TABLET DAILY (Patient not taking: No sig reported)     ergocalciferol (ERGOCALCIFEROL) 1.25 MG (21629 UT) capsule Take 50,000 Units by mouth     fish oil-omega-3 fatty acids 1000 MG capsule Take 2 g by mouth daily     gabapentin (NEURONTIN) 300 MG capsule Taper off by taking one less  tab every 3-4 days.     HEMP OIL OR EXTRACT OR OTHER CBD CANNABINOID, NOT MEDICAL CANNABIS,      hydrOXYzine (ATARAX) 25 MG tablet Take 25 mg by mouth     Krill Oil (OMEGA-3) 500 MG CAPS Take 1 capsule by mouth     Krill Oil 350 MG CAPS daily.     LORazepam (ATIVAN) 1 MG tablet Take 1 tablet (1 mg) by mouth once as needed for anxiety (Take one hour prior to imaging study) (Patient not taking: Reported on 11/2/2020)     Multiple Vitamins-Minerals (MULTI ADULT GUMMIES PO) Take 1 tablet by mouth     multivitamin w/minerals (MULTI-VITAMIN) tablet Take 1 tablet by mouth daily     naproxen sodium (ANAPROX) 220 MG tablet Take 220 mg by mouth     oxyCODONE (ROXICODONE) 5 MG tablet Take 5 mg by mouth     polyethylene glycol (MIRALAX) 17 GM/Dose powder Take 17 g by mouth     SEASONALE 0.15-0.03 MG OR TABS 1 TABLET DAILY     senna-docusate (SENOKOT-S/PERICOLACE) 8.6-50 MG tablet Take 1-2 tablets by mouth     tiZANidine (ZANAFLEX) 4 MG tablet Take 4 mg by mouth     traMADol (ULTRAM) 50 MG tablet Take 50 mg by mouth     Turmeric 500 MG CAPS Take 500 mg by mouth     vitamin D3 (CHOLECALCIFEROL) 1.25 MG (95021 UT) capsule Take 50,000 Units by mouth every 7 days      Review of Systems:   A comprehensive 10 point review of systems (constitutional, ENT, cardiac, peripheral vascular, lymphatic, respiratory, GI, , Musculoskeletal, skin, Neurological) was performed and found to be negative except as described in this note.      Physical Exam:   General: Seated comfortably in no acute distress.  HEENT: Neck supple with normal range of motion.   Skin: No rashes  Neurologic:     Mental Status: Fully alert, attentive and oriented. Speech clear and fluent, no paraphasic errors.     Cranial Nerves: EOMI with normal smooth pursuit. Facial movements symmetric. Hearing not formally tested but intact to conversation.  No dysarthria.     Motor: No tremors or other abnormal movements observed.         Assessment and Plan:   Assessment:  Likely  "L3-4 radiculopathy on left leg and likely to some degree the right leg    The patient has no new symptoms, and still reports sensory disturbances (left anterior-lateral thigh and lower leg).  EMG showed no major issues of motor conduction or sensory conduction, but did show some evidence for chronic mild neurogenic changes of the iliopsoas and vastus lateralis.  Given the patient's prior imaging, varied sensory issues outside of the femoral nerve innervated region, I suspect that the patient is less likely to have a femoral mono-neuropathy and more likely to have a L3-4 nerve root injury.  Given the uncertainty behind this evidence, and symptom progression, I would still think looking for an atypical cause of small fiber neuropathy may be helpful.  Outside of these results, the patient doesn't have any evidence for myelitis or central lesion of an inflammatory/infectious nature on prior imaging or clinically.     Plan:  B12, B6, B2, B1, MMA, Lipids fasting, Vitamin D, Vitamin E, SPEP, Immunofixation, Glucose tolerance test      Follow up in Neurology clinic  as needed should new concerns arise.    LUMA Jones D.O.   of Neurology      Greater than 50% of the total time (30 min) in this patient encounter was spent on counseling and/or coordination of care. We reviewed diagnostic results, impressions, and discussed other possible tests if symptoms do not improve. We discussed the implications of the diagnosis, as well as risks and benefits of management options. We reviewed treatment instructions and our scheduled follow-up as specified in the discharge plan. We also discussed the importance of compliance with the chosen course of treatment. The patient is in agreement with this plan and has no further questions.      Cleo Crowe is a 39 year old female who is being evaluated via a billable video visit.      The patient has been notified of following:     \"This video visit will be " "conducted via a call between you and your physician/provider. We have found that certain health care needs can be provided without the need for an in-person physical exam.  This service lets us provide the care you need with a video conversation.  If a prescription is necessary we can send it directly to your pharmacy.  If lab work is needed we can place an order for that and you can then stop by our lab to have the test done at a later time.    Video visits are billed at different rates depending on your insurance coverage.  Please reach out to your insurance provider with any questions.    If during the course of the call the physician/provider feels a video visit is not appropriate, you will not be charged for this service.\"    Patient has given verbal consent for Video visit?YES  How would you like to obtain your AVS? Mychart        Video-Visit Details    Type of service:  Video Visit    Video Start Time: 430pm  Video End Time: 500pm    Originating Location (pt. Location): Home    Distant Location (provider location):  [facility] [place]     Platform used for Video Visit: Earl Falcon, EMT            Again, thank you for allowing me to participate in the care of your patient.      Sincerely,    Alen Jones, DO      "

## 2020-11-09 NOTE — PROGRESS NOTES
[facility] Neurology Follow Up Visit    Cleo Crowe MRN# [id number]   Age: 39 year old YOB: 1980     Brief history of symptoms: The patient was initially seen in neurologic consultation on 8/21/2020 and then for follow up on 11/2/2020 for evaluation of parethesias. Please see the comprehensive neurologic consultation note from that date in the Epic records for details. Impression after follow up was for sensory loss over the left anterior thigh/posterior leg/sole of foot in atypical distrubution not characteristic for dermatome or peripheral nerve injury.  Further evaluation with EMG was to be done.    Interval history: EMG was done on 11/4/2020, which showed mild neurogenic changes in left iliopsoas and vastus lateralis.  Interestingly, sensory and motor nerve conduction studies were normal.    Today, there is no major progression of symptoms since this last week.  The patient is taking gabapentin 900 mg TID, and it hasn't really been helpful.       Medications:     Current Outpatient Medications   Medication Sig     acetaminophen (TYLENOL) 325 MG tablet Take 650 mg by mouth     albuterol (PROAIR HFA/PROVENTIL HFA/VENTOLIN HFA) 108 (90 Base) MCG/ACT inhaler Inhale 2 puffs into the lungs     aspirin (ASA) 325 MG tablet Take 325 mg by mouth     CLARITIN 10 MG OR TABS ONE DAILY     DEMULEN 1/35 (28) 1-35 MG-MCG OR TABS 1 TABLET DAILY (Patient not taking: No sig reported)     ergocalciferol (ERGOCALCIFEROL) 1.25 MG (59604 UT) capsule Take 50,000 Units by mouth     fish oil-omega-3 fatty acids 1000 MG capsule Take 2 g by mouth daily     gabapentin (NEURONTIN) 300 MG capsule Taper off by taking one less tab every 3-4 days.     HEMP OIL OR EXTRACT OR OTHER CBD CANNABINOID, NOT MEDICAL CANNABIS,      hydrOXYzine (ATARAX) 25 MG tablet Take 25 mg by mouth     Krill Oil (OMEGA-3) 500 MG CAPS Take 1 capsule by mouth     Krill Oil 350 MG CAPS daily.     LORazepam (ATIVAN) 1 MG tablet Take 1 tablet (1 mg) by mouth  once as needed for anxiety (Take one hour prior to imaging study) (Patient not taking: Reported on 11/2/2020)     Multiple Vitamins-Minerals (MULTI ADULT GUMMIES PO) Take 1 tablet by mouth     multivitamin w/minerals (MULTI-VITAMIN) tablet Take 1 tablet by mouth daily     naproxen sodium (ANAPROX) 220 MG tablet Take 220 mg by mouth     oxyCODONE (ROXICODONE) 5 MG tablet Take 5 mg by mouth     polyethylene glycol (MIRALAX) 17 GM/Dose powder Take 17 g by mouth     SEASONALE 0.15-0.03 MG OR TABS 1 TABLET DAILY     senna-docusate (SENOKOT-S/PERICOLACE) 8.6-50 MG tablet Take 1-2 tablets by mouth     tiZANidine (ZANAFLEX) 4 MG tablet Take 4 mg by mouth     traMADol (ULTRAM) 50 MG tablet Take 50 mg by mouth     Turmeric 500 MG CAPS Take 500 mg by mouth     vitamin D3 (CHOLECALCIFEROL) 1.25 MG (32183 UT) capsule Take 50,000 Units by mouth every 7 days      Review of Systems:   A comprehensive 10 point review of systems (constitutional, ENT, cardiac, peripheral vascular, lymphatic, respiratory, GI, , Musculoskeletal, skin, Neurological) was performed and found to be negative except as described in this note.      Physical Exam:   General: Seated comfortably in no acute distress.  HEENT: Neck supple with normal range of motion.   Skin: No rashes  Neurologic:     Mental Status: Fully alert, attentive and oriented. Speech clear and fluent, no paraphasic errors.     Cranial Nerves: EOMI with normal smooth pursuit. Facial movements symmetric. Hearing not formally tested but intact to conversation.  No dysarthria.     Motor: No tremors or other abnormal movements observed.         Assessment and Plan:   Assessment:  Likely L3-4 radiculopathy on left leg and likely to some degree the right leg    The patient has no new symptoms, and still reports sensory disturbances (left anterior-lateral thigh and lower leg).  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  Given the patient's prior imaging, varied sensory issues outside of the femoral nerve innervated region, I suspect that the patient is less likely to have a femoral mono-neuropathy and more likely to have a L3-4 nerve root injury.  Given the uncertainty behind this evidence, and symptom progression, I would still think looking for an atypical cause of small fiber neuropathy may be helpful.  Outside of these results, the patient doesn't have any evidence for myelitis or central lesion of an inflammatory/infectious nature on prior imaging or clinically.     Plan:  B12, B6, B2, B1, MMA, Lipids fasting, Vitamin D, Vitamin E, SPEP, Immunofixation, Glucose tolerance test      Follow up in Neurology clinic  as needed should new concerns arise.    LUMA Jones D.O.   of Neurology      Greater than 50% of the total time (30 min) in this patient encounter was spent on counseling and/or coordination of care. We reviewed diagnostic results, impressions, and discussed other possible tests if symptoms do not improve. We discussed the implications of the diagnosis, as well as risks and benefits of management options. We reviewed treatment instructions and our scheduled follow-up as specified in the discharge plan. We also discussed the importance of compliance with the chosen course of treatment. The patient is in agreement with this plan and has no further questions.

## 2020-11-09 NOTE — PROGRESS NOTES
"Cleo Crowe is a 39 year old female who is being evaluated via a billable video visit.      The patient has been notified of following:     \"This video visit will be conducted via a call between you and your physician/provider. We have found that certain health care needs can be provided without the need for an in-person physical exam.  This service lets us provide the care you need with a video conversation.  If a prescription is necessary we can send it directly to your pharmacy.  If lab work is needed we can place an order for that and you can then stop by our lab to have the test done at a later time.    Video visits are billed at different rates depending on your insurance coverage.  Please reach out to your insurance provider with any questions.    If during the course of the call the physician/provider feels a video visit is not appropriate, you will not be charged for this service.\"    Patient has given verbal consent for Video visit?YES  How would you like to obtain your AVS? Mychart        Video-Visit Details    Type of service:  Video Visit    Video Start Time: 430pm  Video End Time: 500pm    Originating Location (pt. Location): Home    Distant Location (provider location):  [facility] [place]     Platform used for Video Visit: Earl Falcon, EMT        "

## 2020-11-09 NOTE — LETTER
Date:December 31, 2020      Patient was self referred, no letter generated. Do not send.        [facility] Health Information

## 2020-12-03 ENCOUNTER — AMBULATORY - HEALTHEAST (OUTPATIENT)
Dept: OTHER | Facility: CLINIC | Age: 40
End: 2020-12-03

## 2020-12-03 ENCOUNTER — DOCUMENTATION ONLY (OUTPATIENT)
Dept: OTHER | Facility: CLINIC | Age: 40
End: 2020-12-03

## 2021-01-15 ENCOUNTER — HEALTH MAINTENANCE LETTER (OUTPATIENT)
Age: 41
End: 2021-01-15

## 2021-06-05 VITALS — BODY MASS INDEX: 22.76 KG/M2 | HEIGHT: 67 IN | WEIGHT: 145 LBS

## 2021-06-12 NOTE — ANESTHESIA PROCEDURE NOTES
Spinal Block    Patient location during procedure: OR  Start time: 10/26/2020 10:23 AM  End time: 10/26/2020 10:27 AM  Reason for block: primary anesthetic    Staffing:  Performing  Anesthesiologist: Stephen Stafford MD    Preanesthetic Checklist  Completed: patient identified, risks, benefits, and alternatives discussed, timeout performed, consent obtained, airway assessed, oxygen available, suction available, emergency drugs available and hand hygiene performed  Spinal Block  Patient position: sitting  Prep: ChloraPrep  Patient monitoring: heart rate, cardiac monitor, continuous pulse ox and blood pressure  Approach: midline  Location: L3-4  Injection technique: single-shot  Needle type: pencil-tip   Needle gauge: 25 G      Additional Notes:  One needle pass, good CSF flow in all four quadrants, no heme appreciated

## 2021-06-12 NOTE — ANESTHESIA POSTPROCEDURE EVALUATION
Patient: Cleo V Kaeding  Procedure(s):  RIGHT TOTAL HIP ARTHROPLASTY (Right)  Anesthesia type: spinal    Patient location: PACU  Last vitals:   Vitals Value Taken Time   /65 10/26/20 1320   Temp 36.7  C (98  F) 10/26/20 1320   Pulse 84 10/26/20 1320   Resp 21 10/26/20 1320   SpO2 100 % 10/26/20 1320     Post vital signs: stable  Level of consciousness: awake and responds to simple questions  Post-anesthesia pain: pain controlled  Post-anesthesia nausea and vomiting: no  Pulmonary: spontaneous respirations, doing well  Cardiovascular: stable and blood pressure at baseline  Hydration: adequate  Anesthetic events: no    QCDR Measures:  ASA# 11 - Karli-op Cardiac Arrest: ASA11B - Patient did NOT experience unanticipated cardiac arrest  ASA# 12 - Karli-op Mortality Rate: ASA12B - Patient did NOT die  ASA# 13 - PACU Re-Intubation Rate: NA - No ETT / LMA used for case  ASA# 10 - Composite Anes Safety: ASA10A - No serious adverse event    Additional Notes:

## 2021-06-12 NOTE — ANESTHESIA CARE TRANSFER NOTE
Last vitals:   Vitals:    10/26/20 1216   BP:    Pulse: 90   Resp: 16   Temp: 36.7  C (98.1  F)   SpO2: 100%     Patient's level of consciousness is drowsy  Spontaneous respirations: yes  Maintains airway independently: yes  Dentition unchanged: yes  Oropharynx: oropharynx clear of all foreign objects    QCDR Measures:  ASA# 20 - Surgical Safety Checklist: WHO surgical safety checklist completed prior to induction    PQRS# 430 - Adult PONV Prevention: 4558F - Pt received => 2 anti-emetic agents (different classes) preop & intraop  ASA# 8 - Peds PONV Prevention: NA - Not pediatric patient, not GA or 2 or more risk factors NOT present  PQRS# 424 - Karli-op Temp Management: 4559F - At least one body temp DOCUMENTED => 35.5C or 95.9F within required timeframe  PQRS# 426 - PACU Transfer Protocol: - Transfer of care checklist used  ASA# 14 - Acute Post-op Pain: ASA14B - Patient did NOT experience pain >= 7 out of 10

## 2021-06-12 NOTE — ANESTHESIA PREPROCEDURE EVALUATION
Anesthesia Evaluation      Patient summary reviewed   History of anesthetic complications     Airway   Mallampati: I  Neck ROM: full   Pulmonary - normal exam   (+) asthma  moderate,  well controlled,     ROS comment: Seasonal allergies.                         Cardiovascular - negative ROS and normal exam  Exercise tolerance: > or = 4 METS   Neuro/Psych    (+) chronic pain    Endo/Other - negative ROS      GI/Hepatic/Renal - negative ROS           Dental - normal exam                        Anesthesia Plan  Planned anesthetic: spinal and peripheral nerve block  Decadron, Zofran.  Diprivan infusion.  Ketamine (0.5 mg/kg).  Magnesium.  Scopolamine.  Fentanyl in Preop for hip pain.  ASA 2     Anesthetic plan and risks discussed with: patient  Anesthesia plan special considerations: antiemetics,   Post-op plan: routine recovery

## 2021-10-24 ENCOUNTER — HEALTH MAINTENANCE LETTER (OUTPATIENT)
Age: 41
End: 2021-10-24

## 2022-02-13 ENCOUNTER — HEALTH MAINTENANCE LETTER (OUTPATIENT)
Age: 42
End: 2022-02-13

## 2022-10-15 ENCOUNTER — HEALTH MAINTENANCE LETTER (OUTPATIENT)
Age: 42
End: 2022-10-15

## 2023-03-26 ENCOUNTER — HEALTH MAINTENANCE LETTER (OUTPATIENT)
Age: 43
End: 2023-03-26

## 2024-01-13 ENCOUNTER — HEALTH MAINTENANCE LETTER (OUTPATIENT)
Age: 44
End: 2024-01-13